# Patient Record
Sex: FEMALE | Race: WHITE
[De-identification: names, ages, dates, MRNs, and addresses within clinical notes are randomized per-mention and may not be internally consistent; named-entity substitution may affect disease eponyms.]

---

## 2017-01-20 ENCOUNTER — HOSPITAL ENCOUNTER (OUTPATIENT)
Dept: HOSPITAL 75 - RAD | Age: 52
End: 2017-01-20
Attending: FAMILY MEDICINE
Payer: MEDICARE

## 2017-01-20 DIAGNOSIS — M79.89: Primary | ICD-10-CM

## 2017-01-20 NOTE — DIAGNOSTIC IMAGING REPORT
EXAMINATION: Left lower extremity duplex venous ultrasound.



TECHNIQUE: DVT protocol. Multiple sonographic images with color

Doppler and waveform interrogation were performed of the left

lower extremity veins with compression and augmentation

maneuvers.



INDICATION: Left leg swelling and discoloration.



FINDINGS: The left lower extremity veins from the groin to below

the knee veins were examined with normal color-flow,

compressibility and waveform demonstrated.



IMPRESSION:

No evidence of DVT in the left lower extremity.









Dictated by:



Dictated on workstation # [id number]

## 2017-01-20 NOTE — XMS REPORT
Continuity of Care Document

 Created on: 2015



SAILAJA HAIR

External Reference #: [id number]

: 1965

Sex: Female



Demographics







 [place]  [address]

 

 Home Phone  [phone]

 

 Preferred Language  English

 

 Marital Status  Unknown

 

 Christian Affiliation  Unknown

 

 Race  Unknown

 

 Ethnic Group  Unknown





Author







 Author  [facility]

 

 Organization  [facility]

 

 Address  Unknown

 

 Phone  Unavailable







Support







 Name  Relationship  Address  Phone

 

 JAYDEN JUSTIN DO  Caregiver  [address]

[place]  [address] [phone]

 

 ANGELICA LAWS MD  Caregiver  [address]  [address] [phone]

 

 BONNIE MAR  Next Of Kin  [address]

[place]  [address] [phone]







Care Team Providers







 Care Team Member Name  Role  Phone

 

 JAYDEN JUSTIN DO  PCP  [phone]







Insurance Providers







 Payer Name  Policy Number  Subscriber Name  Relationship

 

 [employer]  [id number]  Sailaja Hair  18 Self / Same As Patient

 

 Medicaid [place]  [id number]  Sailaja Hair  18 Self / Same As Patient







Advance Directives







 Directive  Response  Recorded Date/Time

 

 Advance Directives  No  01/25/15 7:52pm

 

 Organ Donor  No  12 6:50am

 

 Resuscitation Status  Full Code  01/25/15 7:52pm







Problems

Medical Problems





 Problem  Onset Date  Status

 

 Laceration of face  Unknown  Active







Medications







 Medication  Dose  Route  Sig  Days/Qty  Instructions  Order Date  Discontinued 
Date  Status

 

 [Dilantin]                 12  Discontinued

 

 Polyethylene Glycol  17 Gm  PO  EVERY OTHER DAY        12     Active

 

 Docusate Sodium  100 Mg  PO  DAILY        12     Active

 

 Lactobacillus Acidophilus  1 Each  PO  TWICE A DAY        12     Active

 

 Calcium Carbonate  1 Tab.chew  PO  THREE TIMES A DAY        12     Active

 

 Magnesium Hydroxide  30 Ml  PO  DAILY PRN        12     Active

 

 Acetaminophen  500 Mg  PO  EVERY 6 HOURS PRN        12     Active

 

 Phenytoin  150 Mg  PO  TWICE A DAY        12     Active

 

 Trimethoprim/Sulfamethoxazole (Bactrim 200 Mg-40 Mg/5 Ml)  2.5 Tsp  PO  TWICE 
A DAY  250 Qty  FOR INFECTION  12     Active

 

 Metronidazole  1 Each  PO  THREE TIMES A DAY  30 Qty     12     Active







Social History







 Social History Problem  Response  Recorded Date/Time

 

 Alcohol Use  Denies Use  2015 7:52pm

 

 Recreational Drug Use  No  2015 7:52pm

 

 Recent Foreign Travel  No  2015 7:52pm

 

 Smoking Status  Never a Smoker  2015 7:52pm









 Query  Response  Start Date  Stop Date

 

 Smoking Status  Never a Smoker      







Hospital Discharge Instructions

No hospital discharge instructions.



Plan of Care

No plan of care.



Functional Status

No functional status results.



Allergies, Adverse Reactions, Alerts







 Allergen  Type  Severity  Reaction  Status  Last Updated

 

 Phenobarbital  Allergy  Mild     Active  08







Immunizations







 Name  Given  Type

 

 Date of Influenza Vaccine  14  Historical







Vital Signs

Acute Vital Signs





 Vital  Response  Date/Time

 

 Temperature (Fahrenheit)  98.1 degrees F (97.6 - 99.5)   

 

 Temperature (Calculated Celsius)  36.14220 degrees C (36.4 - 37.5)   

 

 Pulse Rate (adult)  89 bpm (60 - 90)   

 

 Respiratory Rate  16 bpm (12 - 24)   

 

 O2 Sat by Pulse Oximetry  99 % (88 - 100)   

 

 Blood Pressure  115/84 mm Hg   

 

 Pain      

 

    Pain Intensity  0     

 

 Height (Feet)  5 feet    

 

 Height (Inches)  4 inches    

 

 Height (Calculated Centimeters)  162.937635 cm    

 

 Weight (Pounds)  120 pounds    

 

 Weight (Calculated Grams)  35571.123 gm    

 

 Weight (Calculated Kilograms)  54.975540 kilograms    

 

 Calculated BMI  20.60     







Results

No known relevant diagnostic tests, laboratory data and/or discharge summary.



Procedures

No known history of procedures.



Encounters







 Encounter  Location  Date/Time

 

 Departed Emergency Room  [facility]  01/25/15 7:22pm











 Recent Diagnosis

## 2017-05-08 ENCOUNTER — HOSPITAL ENCOUNTER (OUTPATIENT)
Dept: HOSPITAL 75 - RAD | Age: 52
End: 2017-05-08
Attending: FAMILY MEDICINE
Payer: MEDICARE

## 2017-05-08 DIAGNOSIS — M79.672: ICD-10-CM

## 2017-05-08 DIAGNOSIS — M79.671: Primary | ICD-10-CM

## 2017-05-09 ENCOUNTER — HOSPITAL ENCOUNTER (OUTPATIENT)
Dept: HOSPITAL 75 - RAD | Age: 52
End: 2017-05-09
Attending: FAMILY MEDICINE
Payer: MEDICARE
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
## 2017-05-09 DIAGNOSIS — J34.89: ICD-10-CM

## 2017-05-09 DIAGNOSIS — M25.559: Primary | ICD-10-CM
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
## 2017-05-09 PROCEDURE — 73523 X-RAY EXAM HIPS BI 5/> VIEWS: CPT

## 2017-05-09 PROCEDURE — 70160 X-RAY EXAM OF NASAL BONES: CPT

## 2017-05-09 NOTE — DIAGNOSTIC IMAGING REPORT
EXAMINATION: Three views of the nasal bones.



INDICATION: Fall.



FINDINGS:

No fracture, dislocation or radiopaque foreign body is seen.

There is slight opacity projecting over the right maxillary sinus

which may relate to mucosal thickening or mucous retention cyst.

If the site of impact is in the right maxillary region, then

maxillofacial CT for better evaluation is suggested.



IMPRESSION: Mild opacity in the right maxillary sinus could be

related to sinus disease. If this is the location of injury and

tenderness, then evaluation with maxillofacial CT to rule out

maxillary sinus injury is recommended.



Dictated by: 



  Dictated on workstation # [id number]

## 2017-05-09 NOTE — DIAGNOSTIC IMAGING REPORT
EXAMINATION: AP view of the pelvis and bilateral hip radiographs.



INDICATION: Fall.



FINDINGS:

No fracture or dislocation is seen. There is bilateral coxa vara

which could be developmental or related to muscle weakness.

Correlate clinically. Moderate amount of fecal material is seen

in the right colon. There is a sclerotic focus with circumscribed

margins abutting the endosteum of the cortex and the proximal

left femur shaft, measuring 1.2 cm, presumably related to a bony

island.



IMPRESSION: No fracture seen.



Dictated by: 



  Dictated on workstation # [id number]

## 2017-05-12 ENCOUNTER — HOSPITAL ENCOUNTER (EMERGENCY)
Dept: HOSPITAL 75 - ER | Age: 52
Discharge: HOME | End: 2017-05-12
Payer: MEDICARE

## 2017-05-12 VITALS — DIASTOLIC BLOOD PRESSURE: 88 MMHG | SYSTOLIC BLOOD PRESSURE: 122 MMHG

## 2017-05-12 VITALS — HEIGHT: 65 IN | WEIGHT: 112 LBS | BODY MASS INDEX: 18.66 KG/M2

## 2017-05-12 DIAGNOSIS — R53.1: ICD-10-CM

## 2017-05-12 DIAGNOSIS — F79: ICD-10-CM

## 2017-05-12 DIAGNOSIS — M79.1: Primary | ICD-10-CM

## 2017-05-12 LAB
ALBUMIN SERPL-MCNC: 4.1 G/DL (ref 3.2–4.5)
ALT SERPL-CCNC: 16 U/L (ref 0–55)
ANION GAP SERPL CALC-SCNC: 8 MMOL/L (ref 5–14)
AST SERPL-CCNC: 23 U/L (ref 5–34)
BASOPHILS # BLD AUTO: 0 10^3/UL (ref 0–0.1)
BASOPHILS NFR BLD AUTO: 0 % (ref 0–10)
BILIRUB SERPL-MCNC: 0.3 MG/DL (ref 0.1–1)
BILIRUB UR QL STRIP: NEGATIVE
BUN SERPL-MCNC: 16 MG/DL (ref 7–18)
BUN/CREAT SERPL: 23
CALCIUM SERPL-MCNC: 9.7 MG/DL (ref 8.5–10.1)
CHLORIDE SERPL-SCNC: 100 MMOL/L (ref 98–107)
CO2 SERPL-SCNC: 31 MMOL/L (ref 21–32)
CREAT SERPL-MCNC: 0.71 MG/DL (ref 0.6–1.3)
EOSINOPHIL # BLD AUTO: 0.3 10^3/UL (ref 0–0.3)
EOSINOPHIL NFR BLD AUTO: 3 % (ref 0–10)
ERYTHROCYTE [DISTWIDTH] IN BLOOD BY AUTOMATED COUNT: 12.4 % (ref 10–14.5)
GFR SERPLBLD BASED ON 1.73 SQ M-ARVRAT: > 60 ML/MIN
GLUCOSE SERPL-MCNC: 200 MG/DL (ref 70–105)
KETONES UR QL STRIP: NEGATIVE
LEUKOCYTE ESTERASE UR QL STRIP: (no result)
LIPASE SERPL-CCNC: 38 U/L (ref 8–78)
LYMPHOCYTES # BLD AUTO: 1.1 X 10^3 (ref 1–4)
LYMPHOCYTES NFR BLD AUTO: 13 % (ref 12–44)
MCH RBC QN AUTO: 32 PG (ref 25–34)
MCHC RBC AUTO-ENTMCNC: 34 G/DL (ref 32–36)
MCV RBC AUTO: 94 FL (ref 80–99)
MONOCYTES # BLD AUTO: 0.9 X 10^3 (ref 0–1)
MONOCYTES NFR BLD AUTO: 10 % (ref 0–12)
NEUTROPHILS # BLD AUTO: 6.2 X 10^3 (ref 1.8–7.8)
NEUTROPHILS NFR BLD AUTO: 73 % (ref 42–75)
NITRITE UR QL STRIP: NEGATIVE
PH UR STRIP: 8 [PH] (ref 5–9)
PLATELET # BLD: 44 10^3/UL (ref 130–400)
PMV BLD AUTO: 11.7 FL (ref 7.4–10.4)
POTASSIUM SERPL-SCNC: 4 MMOL/L (ref 3.6–5)
PROT SERPL-MCNC: 7.4 G/DL (ref 6.4–8.2)
PROT UR QL STRIP: (no result)
RBC # BLD AUTO: 4.3 10^6/UL (ref 4.35–5.85)
SODIUM SERPL-SCNC: 139 MMOL/L (ref 135–145)
SP GR UR STRIP: 1.01 (ref 1.02–1.02)
UROBILINOGEN UR-MCNC: NORMAL MG/DL
WBC # BLD AUTO: 8.5 10^3/UL (ref 4.3–11)
WBC #/AREA URNS HPF: (no result) /HPF

## 2017-05-12 PROCEDURE — 96361 HYDRATE IV INFUSION ADD-ON: CPT

## 2017-05-12 PROCEDURE — 81000 URINALYSIS NONAUTO W/SCOPE: CPT

## 2017-05-12 PROCEDURE — 80053 COMPREHEN METABOLIC PANEL: CPT

## 2017-05-12 PROCEDURE — 71010: CPT

## 2017-05-12 PROCEDURE — 36415 COLL VENOUS BLD VENIPUNCTURE: CPT

## 2017-05-12 PROCEDURE — 85025 COMPLETE CBC W/AUTO DIFF WBC: CPT

## 2017-05-12 PROCEDURE — 99282 EMERGENCY DEPT VISIT SF MDM: CPT

## 2017-05-12 PROCEDURE — 96374 THER/PROPH/DIAG INJ IV PUSH: CPT

## 2017-05-12 PROCEDURE — 83690 ASSAY OF LIPASE: CPT

## 2017-05-12 PROCEDURE — 74177 CT ABD & PELVIS W/CONTRAST: CPT

## 2017-05-12 NOTE — ED GENERAL
General


Chief Complaint:  General Problems/Pain


Stated Complaint:  WEAKNESS,BODY ACHES


Nursing Triage Note:  


PT BROUGHT TO ED BY STAFF, PT IS MR AND HAS STOPPED WALKING AND IS UNABLE TO 


FEED SELF AT THIS X, CO OF PAIN FEET HANDS AND ARMS, PT HAS SEEN DR JUSTIN 3 


X'S THIS WEEK FOR XRAYS AND LABS W NO RESOLUTION OF SX. PT IS ALERT AND WANTING 


TO GO HOME. PT IS ACCOMPANIED BY STAFF X2


Nursing Sepsis Screen:  No Definite Risk


Source of Information:  Patient, Caregiver


Exam Limitations:  Physical Impairments





History of Present Illness


Time Seen by Provider:  13:21


Initial Comments


This 51-year-old mentally retarded female presents with a one-week history of 

complaints of poorly localized pains.  The patient has been to see her primary 

care doctor 3 times this week for evaluation of pains.  Variety of apparently 

unremarkable laboratory examinations and lower extremity x-rays have been done.

  Patient is under care of Dr. Justin.  The patient has been apparently 

unable to weight-bear due to the discomfort.  On questioning the patient she is 

unable to localize her discomfort.





Patient's caregiver is now noted a resting tachycardia.





Allergies and Home Medications


Allergies


Coded Allergies:  


     phenobarbital (Verified  Allergy, Mild, 5/19/08)





Home Medications


Acetaminophen 500 Mg Tablet, 500 MG PO Q6H PRN, (Reported)


Ca Carbonate/Vitamin D3/Vit K 1 Each Tab.chew, 1 EACH PO TID, (Reported)


Calcium/Fa/Multivits-Min 1 Tab.chew Tab.chew, 1 TAB.CHEW PO TID, (Reported)


Docusate Sodium 100 Mg Capsule, 100 MG PO DAILY, (Reported)


Lactobacillus Acidophilus 1 Each Tablet, 1 EACH PO BID, (Reported)


Lactobacillus Acidophilus 100 Mg Capsule, 100 MG PO DAILY, (Reported)


Loperamide Hcl/Simethicone 1 Each Tablet, 1 EACH PO PRN PRN for DIARRHEA, (

Reported)


Loratadine 10 Mg Capsule, 10 MG PO DAILY, (Reported)


Magnesium Hydroxide 400 Mg/5 Ml Oral.susp, 30 ML PO DAILY PRN, (Reported)


Phenytoin 50 Mg Tab.chew, 150 MG PO BID, (Reported)


Polyethylene Glycol 17 Gm Pack, 17 GM PO EVERY OTHER DAY, (Reported)





Constitutional:  No chills, No fever


EENTM:  No eye pain


Respiratory:  No cough, No short of breath


Gastrointestinal:  No RUQ


Genitourinary:  No dysuria, No frequency


Musculoskeletal:  No back pain


Skin:  No change in color


Psychiatric/Neurological:  Denies Anxiety, Denies Headache


Hematologic/Lymphatic:  No Symptoms Reported


Immunological/Allergic:  no symptoms reported





Past Medical-Social-Family Hx


Patient Social History


Alcohol Use:  Denies Use


Recreational Drug Use:  No


Smoking Status:  Never a Smoker


Recent Foreign Travel:  No


Contact w/Someone Who Travel:  No


Recent Infectious Disease Expo:  No


Recent Hopitalizations:  No





Immunizations Up To Date


Date of Influenza Vaccine:  Sep 30, 2014





Seasonal Allergies


Seasonal Allergies:  No





Respiratory


Hx Respiratory Disorders:  No





Cardiovascular


Hx Cardiac Disorders:  No


Cardiac Disorders:  Hypertension





Neurological


Hx Neurological Disorders:  Yes (convulsions, MR)


Neurological Disorders:  Seizure Disorder





Gastrointestinal


Hx Gastrointestinal Disorders:  Yes


Gastrointestinal Disorders:  Chronic Constipation





Musculoskeletal


Hx Musculoskeletal Disorders:  Yes


Musculoskeletal Disorders:  Arthritis





Family Medical History


Significant Family History:  No Pertinent Family Hx





Physical Exam


Vital Signs





Vital Sign - Last 12Hours








 5/12/17





 13:00


 


Temp 97.9


 


Pulse 123


 


Resp 18


 


B/P (MAP) 124/108


 


Pulse Ox 96





Capillary Refill : Less Than 3 Seconds


General Appearance:  No Apparent Distress, Cachetic


Eyes:  Bilateral Eye Normal Inspection


HEENT:  Normal ENT Inspection


Neck:  Normal Inspection


Respiratory:  Chest Non Tender, Lungs Clear, Normal Breath Sounds


Cardiovascular:  Regular Rate, Rhythm


Gastrointestinal:  Normal Bowel Sounds, No Organomegaly, No Pulsatile Mass


Back:  Normal Inspection, No CVA Tenderness


Extremity:  Normal Capillary Refill, Normal Inspection


Neurologic/Psychiatric:  Alert, Oriented x3


Skin:  Normal Color, Warm/Dry





Progress/Results/Core Measures


Results/Orders


Lab Results





Laboratory Tests








Test


  5/12/17


13:30 5/12/17


15:50 Range/Units


 


 


White Blood Count


  8.5 


  


  4.3-11.0


10^3/uL


 


Red Blood Count


  4.30 L


  


  4.35-5.85


10^6/uL


 


Hemoglobin 13.6   11.5-16.0  G/DL


 


Hematocrit 40   35-52  %


 


Mean Corpuscular Volume 94   80-99  FL


 


Mean Corpuscular Hemoglobin 32   25-34  PG


 


Mean Corpuscular Hemoglobin


Concent 34 


  


  32-36  G/DL


 


 


Red Cell Distribution Width 12.4   10.0-14.5  %


 


Platelet Count


  44 L


  


  130-400


10^3/uL


 


Mean Platelet Volume 11.7 H  7.4-10.4  FL


 


Neutrophils (%) (Auto) 73   42-75  %


 


Lymphocytes (%) (Auto) 13   12-44  %


 


Monocytes (%) (Auto) 10   0-12  %


 


Eosinophils (%) (Auto) 3   0-10  %


 


Basophils (%) (Auto) 0   0-10  %


 


Neutrophils # (Auto) 6.2   1.8-7.8  X 10^3


 


Lymphocytes # (Auto) 1.1   1.0-4.0  X 10^3


 


Monocytes # (Auto) 0.9   0.0-1.0  X 10^3


 


Eosinophils # (Auto)


  0.3 


  


  0.0-0.3


10^3/uL


 


Basophils # (Auto)


  0.0 


  


  0.0-0.1


10^3/uL


 


Sodium Level 139   135-145  MMOL/L


 


Potassium Level 4.0   3.6-5.0  MMOL/L


 


Chloride Level 100     MMOL/L


 


Carbon Dioxide Level 31   21-32  MMOL/L


 


Anion Gap 8   5-14  MMOL/L


 


Blood Urea Nitrogen 16   7-18  MG/DL


 


Creatinine


  0.71 


  


  0.60-1.30


MG/DL


 


Estimat Glomerular Filtration


Rate > 60 


  


   


 


 


BUN/Creatinine Ratio 23    


 


Glucose Level 200 H    MG/DL


 


Calcium Level 9.7   8.5-10.1  MG/DL


 


Total Bilirubin 0.3   0.1-1.0  MG/DL


 


Aspartate Amino Transf


(AST/SGOT) 23 


  


  5-34  U/L


 


 


Alanine Aminotransferase


(ALT/SGPT) 16 


  


  0-55  U/L


 


 


Alkaline Phosphatase 105     U/L


 


Total Protein 7.4   6.4-8.2  G/DL


 


Albumin 4.1   3.2-4.5  G/DL


 


Lipase 38   8-78  U/L


 


Urine Color  YELLOW   


 


Urine Clarity  CLEAR   


 


Urine pH  8  5-9  


 


Urine Specific Gravity  1.010 L 1.016-1.022  


 


Urine Protein  1+ H NEGATIVE  


 


Urine Glucose (UA)  2+ H NEGATIVE  


 


Urine Ketones  NEGATIVE  NEGATIVE  


 


Urine Nitrite  NEGATIVE  NEGATIVE  


 


Urine Bilirubin  NEGATIVE  NEGATIVE  


 


Urine Urobilinogen  NORMAL  NORMAL  MG/DL


 


Urine Leukocyte Esterase  1+ H NEGATIVE  


 


Urine RBC (Auto)  5+ H NEGATIVE  


 


Urine RBC  10-25 H  /HPF


 


Urine WBC  0-2   /HPF


 


Urine Crystals  PRESENT H  /LPF


 


Urine Amorphous Sediment


  


  FEW LEXIE


PHOSPHATE H  /LPF


 


 


Urine Bacteria  NONE   /HPF


 


Urine Casts  NONE   /LPF


 


Urine Mucus  NEGATIVE   /LPF


 


Urine Culture Indicated  NO   








My Orders





Orders - KATERINA NEGRETE MD


Ct Abdomen/Pelvis W (5/12/17 13:17)


Cbc With Automated Diff (5/12/17 13:17)


Comprehensive Metabolic Panel (5/12/17 13:17)


Lipase (5/12/17 13:17)


Ua Culture If Indicated (5/12/17 13:17)


Ns Iv 1000 Ml (Sodium Chloride 0.9%) (5/12/17 13:30)


Fentanyl  Injection (Sublimaze Injection (5/12/17 13:30)


Chest 1 View, Ap/Pa Only (5/12/17 13:17)


Iohexol Injection (Omnipaque 350 Mg/Ml 1 (5/12/17 13:30)


Ns (Ivpb) (Sodium Chloride 0.9% Ivpb Bag (5/12/17 13:30)





Medications Given in ED





Current Medications








 Medications  Dose


 Ordered  Sig/Margie


 Route  Start Time


 Stop Time Status Last Admin


Dose Admin


 


 Fentanyl Citrate  50 mcg  ONCE  ONCE


 IVP  5/12/17 13:30


 5/12/17 13:31 DC 5/12/17 13:55


50 MCG


 


 Iohexol  100 ml  ONCE  ONCE


 IV  5/12/17 13:30


 5/12/17 13:31 DC 5/12/17 14:51


100 ML


 


 Sodium Chloride  100 ml  ONCE  ONCE


 IV  5/12/17 13:30


 5/12/17 13:31 DC 5/12/17 14:51


100 ML








Vital Signs/I&O





Vital Sign - Last 12Hours








 5/12/17





 13:00


 


Temp 97.9


 


Pulse 123


 


Resp 18


 


B/P (MAP) 124/108


 


Pulse Ox 96














Blood Pressure Mean:  113








Progress Note :  


   Time:  16:25


Progress Note


The patient's chest x-ray, CT of the abdomen and pelvis, CBC and urinalysis 

were all unremarkable other than evidence of microscopic blood (patient is on 

her period).





Treatment course in emergency department consisted of 50 g of fentanyl in 

addition to IV fluids.  The patient was markedly improved following this 

treatment.





The patient's CT well and straight no evidence of pathology didn't demonstrate 

evidence of significant obstipation.





Departure


Impression


Impression:  


 Primary Impression:  


 General medical exam


Disposition:  01 HOME, SELF-CARE


Condition:  Improved





Departure-Patient Inst.


Decision time for Depature:  16:27


Referrals:  


JAYDEN JUSTIN DO (PCP/Family)


Primary Care Physician


Patient Instructions:  CHRONIC PAIN





Add. Discharge Instructions:  


The patient and caregivers were asked to employed Tylenol for discomfort over 

the weekend.  They were encouraged to use a fleets enema for the obstipation.  

They were invited to return to emergency department this weekend if the pain 

persisted or increased.  There are S follow-up with their primary care physician

, Dr. Justin, on Monday. All discharge instructions reviewed with patient and

/or family. Voiced understanding.











KATERINA NEGRETE MD May 12, 2017 13:25

## 2017-05-12 NOTE — DIAGNOSTIC IMAGING REPORT
PROCEDURE: CT abdomen and pelvis with contrast.



TECHNIQUE: Multiple contiguous axial images were obtained through

the abdomen and pelvis after administration of intravenous

contrast. 



INDICATION: Weakness. 



COMPARISON: None.



FINDINGS: Examination is limited by motion artifact. The lung

bases are clear. The liver, gallbladder, pancreas, spleen,

adrenals, right kidney, and collecting systems are unremarkable.

Benign-appearing subcentimeter cyst in the left kidney. The

appendix is not identified and may be surgically absent. No

suspicious inflammatory findings in the region of the cecum.

Large amount of stool throughout the colon and rectum. No free

intraperitoneal air or fluid. No evidence of bowel obstruction.

Small simple-appearing cystic structures in both adnexa, the

largest measuring up to up to 1.8 cm on the left presumably

represents ovarian cysts. No lymphadenopathy. No acute osseous

findings.



IMPRESSION:

1. No acute CT findings in the abdomen or pelvis.

2. Large amount of stool in the colon and rectum may represent a

degree of constipation.

3. Small cystic structures within both adnexa likely represent

benign ovarian cysts. This could be further evaluated with pelvic

ultrasound if clinically warranted.



Dictated by: 



  Dictated on workstation # [id number]

## 2017-05-12 NOTE — DIAGNOSTIC IMAGING REPORT
INDICATION: 

Weakness and bodyaches.



EXAMINATION:

Portable chest at 2:32 PM.



FINDINGS:

The heart size and pulmonary vascularity are normal. The lungs

are clear. There are no effusions or pneumothoraces.



IMPRESSION: 

Negative chest.



Dictated by: 



  Dictated on workstation # [id number]

## 2017-07-21 ENCOUNTER — HOSPITAL ENCOUNTER (OUTPATIENT)
Dept: HOSPITAL 75 - REHAB | Age: 52
Discharge: HOME | End: 2017-07-21
Attending: FAMILY MEDICINE
Payer: MEDICARE

## 2017-07-21 DIAGNOSIS — R26.81: Primary | ICD-10-CM

## 2017-12-07 ENCOUNTER — HOSPITAL ENCOUNTER (OUTPATIENT)
Dept: HOSPITAL 75 - RAD | Age: 52
End: 2017-12-07
Attending: FAMILY MEDICINE
Payer: MEDICARE

## 2017-12-07 DIAGNOSIS — Z12.31: Primary | ICD-10-CM

## 2017-12-08 NOTE — DIAGNOSTIC IMAGING REPORT
Bilateral screening mammogram 2D views with tomosynthesis.



The current study was also evaluated with a Computer Aided

Detection (CAD) system.



INDICATION: Screening. No current complaints stated on the

questionnaire.



COMPARISON: 11/10/16.



FINDINGS:



The breasts are composed of extremely dense parenchyma which

would decrease mammographic sensitivity. There is no definitive

underlying mass or suspicious calcifications seen. Allowing for

technique and positional differences, no suspicious change is

seen.



IMPRESSION: Dense breasts with no definite change. 



ACR BI-RADS Category 2: Benign findings.

Result letter will be mailed to the patient.

Note: At least 10% of breast cancer is not imaged by mammography.



  Dictated on workstation # [id number]

## 2019-11-15 ENCOUNTER — HOSPITAL ENCOUNTER (OUTPATIENT)
Dept: HOSPITAL 75 - RAD | Age: 54
End: 2019-11-15
Attending: FAMILY MEDICINE
Payer: MEDICARE

## 2019-11-15 DIAGNOSIS — M95.0: Primary | ICD-10-CM
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
## 2019-11-15 DIAGNOSIS — R22.0: ICD-10-CM

## 2019-11-15 PROCEDURE — 70450 CT HEAD/BRAIN W/O DYE: CPT

## 2019-11-15 PROCEDURE — 70486 CT MAXILLOFACIAL W/O DYE: CPT

## 2019-11-15 NOTE — DIAGNOSTIC IMAGING REPORT
CLINICAL INDICATION: Patient fell on face out of the wheelchair.



EXAMS: 

1:  Axial Head CT without IV contrast. 



2:  Axial Maxillofacial CT scan without IV contrast with sagittal

and coronal reformations. 



Auto Exposure Controls were utilized during the CT exam to meet

ALARA standards for radiation dose reduction.



COMPARISON: Head CT without contrast dated 07/23/2012.



FINDINGS:



Head CT:

There is no evidence of acute cerebral infarct, intracranial

hemorrhage, or gross mass effect. 



Stable diffuse brain parenchymal volume loss with parietal lobe

affected the most. There is normal gray-white matter distinction.

 There is no significant midline shift or herniation. 



 There is no evidence of hydrocephalus. The basal cisterns are

unremarkable. 

Maxillofacial CT:



There is motion artifact which greatly limits evaluation of the

bilateral mandibular condyles and mandibular neck regions.

Fractures in these regions are unable to be evaluated. It is

noted that there is malalignment between the maxilla and mandible

unknown if this is due to patient's positioning especially with

the degree of motion artifact involving the mandibular condyles.



Otherwise, there is no skull or maxillofacial fracture seen.

There are stable chronic compressed deformity of the nasal bone

anteriorly. There is mild mucosal thickening involving both

maxillary sinus, ethmoid sinus and sphenoid sinus.



There is a small area of extracranial soft tissue swelling and

air likely related to laceration involving the right forehead

region. There is no skull or maxillofacial fracture seen.



There are hypertrophic cervical spine spurs which may be seen

with DISH.



IMPRESSION:

1.: Stable CT scan of brain with no evidence of acute

intracranial process. There is no intracranial hemorrhage.



2: There is motion artifact greatly obscuring the mandibular

condyles and mandibular neck regions. Evaluation for fracture in

this region is greatly limited. There is also noted malalignment

in the maxilla and mandible and unknown if this is related to

patient's positioning especially given significant motion

artifact.



3: There is no skull or maxillofacial fracture seen. There is a

chronic bony deformity compressed appearance of the nasal bone.



4: There is small amount of extrarenal soft tissue swelling in

the right forehead region and subcutaneous air which may be

related to laceration.



5: Otherwise, there is no skull or maxillofacial fracture seen.



Dictated by: 



  Dictated on workstation # [id number]

## 2021-03-02 ENCOUNTER — HOSPITAL ENCOUNTER (OUTPATIENT)
Dept: HOSPITAL 75 - RAD | Age: 56
End: 2021-03-02
Attending: FAMILY MEDICINE
Payer: MEDICARE

## 2021-03-02 DIAGNOSIS — Z12.31: Primary | ICD-10-CM

## 2022-08-10 ENCOUNTER — HOSPITAL ENCOUNTER (OUTPATIENT)
Dept: HOSPITAL 75 - RAD | Age: 57
End: 2022-08-10
Attending: FAMILY MEDICINE
Payer: MEDICARE
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
## 2022-08-10 DIAGNOSIS — M25.551: Primary | ICD-10-CM
